# Patient Record
Sex: MALE | Race: WHITE | NOT HISPANIC OR LATINO | URBAN - METROPOLITAN AREA
[De-identification: names, ages, dates, MRNs, and addresses within clinical notes are randomized per-mention and may not be internally consistent; named-entity substitution may affect disease eponyms.]

---

## 2019-11-21 ENCOUNTER — EMERGENCY (EMERGENCY)
Facility: HOSPITAL | Age: 44
LOS: 1 days | Discharge: HOME | End: 2019-11-21
Attending: EMERGENCY MEDICINE | Admitting: EMERGENCY MEDICINE
Payer: MEDICAID

## 2019-11-21 VITALS
OXYGEN SATURATION: 99 % | SYSTOLIC BLOOD PRESSURE: 144 MMHG | TEMPERATURE: 98 F | HEART RATE: 124 BPM | RESPIRATION RATE: 18 BRPM | DIASTOLIC BLOOD PRESSURE: 87 MMHG

## 2019-11-21 LAB
ALBUMIN SERPL ELPH-MCNC: 4.8 G/DL — SIGNIFICANT CHANGE UP (ref 3.3–5)
ALP SERPL-CCNC: 89 U/L — SIGNIFICANT CHANGE UP (ref 40–120)
ALT FLD-CCNC: 41 U/L — SIGNIFICANT CHANGE UP (ref 4–41)
ANION GAP SERPL CALC-SCNC: 17 MMO/L — HIGH (ref 7–14)
AST SERPL-CCNC: 20 U/L — SIGNIFICANT CHANGE UP (ref 4–40)
BILIRUB SERPL-MCNC: 0.8 MG/DL — SIGNIFICANT CHANGE UP (ref 0.2–1.2)
BUN SERPL-MCNC: 12 MG/DL — SIGNIFICANT CHANGE UP (ref 7–23)
CALCIUM SERPL-MCNC: 10.6 MG/DL — HIGH (ref 8.4–10.5)
CHLORIDE SERPL-SCNC: 91 MMOL/L — LOW (ref 98–107)
CO2 SERPL-SCNC: 26 MMOL/L — SIGNIFICANT CHANGE UP (ref 22–31)
CREAT SERPL-MCNC: 0.63 MG/DL — SIGNIFICANT CHANGE UP (ref 0.5–1.3)
GLUCOSE SERPL-MCNC: 364 MG/DL — HIGH (ref 70–99)
HCT VFR BLD CALC: 47.7 % — SIGNIFICANT CHANGE UP (ref 39–50)
HGB BLD-MCNC: 16.2 G/DL — SIGNIFICANT CHANGE UP (ref 13–17)
MCHC RBC-ENTMCNC: 30.9 PG — SIGNIFICANT CHANGE UP (ref 27–34)
MCHC RBC-ENTMCNC: 34 % — SIGNIFICANT CHANGE UP (ref 32–36)
MCV RBC AUTO: 90.9 FL — SIGNIFICANT CHANGE UP (ref 80–100)
NRBC # FLD: 0 K/UL — SIGNIFICANT CHANGE UP (ref 0–0)
PLATELET # BLD AUTO: 382 K/UL — SIGNIFICANT CHANGE UP (ref 150–400)
PMV BLD: 10.2 FL — SIGNIFICANT CHANGE UP (ref 7–13)
POTASSIUM SERPL-MCNC: 4.4 MMOL/L — SIGNIFICANT CHANGE UP (ref 3.5–5.3)
POTASSIUM SERPL-SCNC: 4.4 MMOL/L — SIGNIFICANT CHANGE UP (ref 3.5–5.3)
PROT SERPL-MCNC: 8.5 G/DL — HIGH (ref 6–8.3)
RBC # BLD: 5.25 M/UL — SIGNIFICANT CHANGE UP (ref 4.2–5.8)
RBC # FLD: 11.6 % — SIGNIFICANT CHANGE UP (ref 10.3–14.5)
SODIUM SERPL-SCNC: 134 MMOL/L — LOW (ref 135–145)
WBC # BLD: 13.38 K/UL — HIGH (ref 3.8–10.5)
WBC # FLD AUTO: 13.38 K/UL — HIGH (ref 3.8–10.5)

## 2019-11-21 PROCEDURE — 99284 EMERGENCY DEPT VISIT MOD MDM: CPT

## 2019-11-21 RX ORDER — AMPICILLIN SODIUM AND SULBACTAM SODIUM 250; 125 MG/ML; MG/ML
INJECTION, POWDER, FOR SUSPENSION INTRAMUSCULAR; INTRAVENOUS
Refills: 0 | Status: DISCONTINUED | OUTPATIENT
Start: 2019-11-21 | End: 2019-11-21

## 2019-11-21 RX ORDER — AMPICILLIN SODIUM AND SULBACTAM SODIUM 250; 125 MG/ML; MG/ML
3 INJECTION, POWDER, FOR SUSPENSION INTRAMUSCULAR; INTRAVENOUS ONCE
Refills: 0 | Status: COMPLETED | OUTPATIENT
Start: 2019-11-21 | End: 2019-11-21

## 2019-11-21 RX ORDER — MORPHINE SULFATE 50 MG/1
4 CAPSULE, EXTENDED RELEASE ORAL ONCE
Refills: 0 | Status: DISCONTINUED | OUTPATIENT
Start: 2019-11-21 | End: 2019-11-21

## 2019-11-21 RX ADMIN — AMPICILLIN SODIUM AND SULBACTAM SODIUM 200 GRAM(S): 250; 125 INJECTION, POWDER, FOR SUSPENSION INTRAMUSCULAR; INTRAVENOUS at 17:14

## 2019-11-21 RX ADMIN — MORPHINE SULFATE 4 MILLIGRAM(S): 50 CAPSULE, EXTENDED RELEASE ORAL at 16:23

## 2019-11-21 NOTE — ED ADULT NURSE NOTE - OBJECTIVE STATEMENT
Pt received to intake room 10A. Pt comes to ED c/o right tooth "abscess" X2 days. Endorses pain. Denies chest pain, dyspnea, N/V/D, chills, fever, weakness, dizziness, headache. Respirations even & unlabored. Minor swelling noted to right side of face. Pt is A&Ox4, ambulates independently.

## 2019-11-21 NOTE — PROGRESS NOTE ADULT - SUBJECTIVE AND OBJECTIVE BOX
Patient is a 43y old  Male who presents with a chief complaint of facial swelling starting on Monday, November 18 PM going into Tuesday, November 19AM.     HPI:  43 year old male with no significant medical history presents to the ED with facial swelling starting on Monday, November 18PM going into Tuesday, November 19AM. Patient states he started a root canal but never had time to return to finish it. Patient states he noticed a "hole" in the tooth and purchased OTC dental filling material and placed it in the "hole" and subsequently his face started to swell up. Patient denies fever, chills, nausea and SOB.     PAST MEDICAL & SURGICAL HISTORY:      MEDICATIONS  (STANDING):    MEDICATIONS  (PRN):      Allergies    No Known Allergies    Intolerances        FAMILY HISTORY:       *SOCIAL HISTORY: Patient has quit smoking tobacco approximately 4 months ago. Patient states he smoked for many, many years.     *Last Dental Visit: Approximately 1 year ago.     Vital Signs Last 24 Hrs  T(C): 36.7 (21 Nov 2019 13:59), Max: 36.7 (21 Nov 2019 13:59)  T(F): 98 (21 Nov 2019 13:59), Max: 98 (21 Nov 2019 13:59)  HR: 124 (21 Nov 2019 13:59) (124 - 124)  BP: 144/87 (21 Nov 2019 13:59) (144/87 - 144/87)  BP(mean): --  RR: 18 (21 Nov 2019 13:59) (18 - 18)  SpO2: 99% (21 Nov 2019 13:59) (99% - 99%)    EOE:  TMJ ( - ) clicks                    ( - ) pops                    ( - ) crepitus             Mandible FROM             Facial bones and MOM grossly intact             ( - ) trismus             ( - ) LAD             ( + ) swelling. Right sided localized buccal space swelling.              ( + ) asymmetry. Right sided asymmetry             ( + ) palpation. Right side              ( - ) SOB             ( - ) dysphagia             ( - ) LOC    IOE:  Permanent dentition with multiple missing teeth            hard/soft palate:  ( - ) palatal torus           tongue/FOM WNL           labial/buccal mucosa WNL           ( - ) percussion. Patient was given 4mg of Morphine prior to dental consult.            ( + ) palpation. Apical to #2/3           ( - ) swelling. No fluctuance appreciated in buccal vestibule. able to palpate buccal vestibule.      LABS:                        16.2   13.38 )-----------( 382      ( 21 Nov 2019 16:00 )             47.7     11-21    134<L>  |  91<L>  |  12  ----------------------------<  364<H>  4.4   |  26  |  0.63    Ca    10.6<H>      21 Nov 2019 16:00    TPro  8.5<H>  /  Alb  4.8  /  TBili  0.8  /  DBili  x   /  AST  20  /  ALT  41  /  AlkPhos  89  11-21    WBC Count: 13.38 K/uL <H> [3.8 - 10.5] (11-21 @ 16:00)  Platelet Count - Automated: 382 K/uL [150 - 400] (11-21 @ 16:00)        *DENTAL RADIOGRAPHS: 1PA taken and reviwed    ASSESSMENT: #3 gross caries extending into the pulp with extensive tooth structure loss.     PROCEDURE:  Limited exam performed and 1PA taken and reviewed. Discussed clinical and radiographic findings with patient. Advised patient that currently there is no fluctuance in the buccal vestibule and advised patient to follow up with [facility] tomorrow morning for an 8:00AM appointment for further evaluation and treatment. Advised patient if he is unable to present to the clinic for his appointment to call and an incision and drainage procedure can be scheduled to relieve pressure and possible purulence. Discussed the importance of presenting to [facility] for treatment and possible risks of swelling increasing if no treatment is completed. All questions answered and patient agrees to present to scheduled appointment.       RECOMMENDATIONS:  1) Dental F/U with [facility] [phone] on Friday, November 22 @ 8:00AM  2) Complete course of antibiotics as prescribed by the ED and OTC Pain medication for pain management.   3) Dental F/U with outpatient dentist for comprehensive dental care.   4) If any difficulty swallowing/breathing, fever occur, new symptoms arise, return to ED.     Diana Arora DDS, #[id number]

## 2019-11-21 NOTE — ED PROVIDER NOTE - OBJECTIVE STATEMENT
43 year old male with poor dentition presents with 2 days of right upper dental pain. 1 day of facial swelling. tolerating po. no fever no other complaints.

## 2019-11-21 NOTE — ED PROVIDER NOTE - PATIENT PORTAL LINK FT
You can access the FollowMyHealth Patient Portal offered by [facility] by registering at the following website: [email or web address]. By joining [employer]’s FollowMyHealth portal, you will also be able to view your health information using other applications (apps) compatible with our system.

## 2019-11-21 NOTE — ED PROVIDER NOTE - NSFOLLOWUPINSTRUCTIONS_ED_ALL_ED_FT
Come back to the [facility] tomorrow morning at 745 AM    [facility]  [phone] [place] [address], 1st Floor  [place] [phone]

## 2021-03-03 PROBLEM — Z78.9 OTHER SPECIFIED HEALTH STATUS: Chronic | Status: ACTIVE | Noted: 2019-11-21

## 2021-03-05 PROBLEM — Z00.00 ENCOUNTER FOR PREVENTIVE HEALTH EXAMINATION: Status: ACTIVE | Noted: 2021-03-05

## 2021-03-08 ENCOUNTER — APPOINTMENT (OUTPATIENT)
Dept: ORTHOPEDIC SURGERY | Facility: CLINIC | Age: 46
End: 2021-03-08

## 2021-03-29 ENCOUNTER — APPOINTMENT (OUTPATIENT)
Dept: ORTHOPEDIC SURGERY | Facility: CLINIC | Age: 46
End: 2021-03-29